# Patient Record
Sex: MALE | Race: WHITE
[De-identification: names, ages, dates, MRNs, and addresses within clinical notes are randomized per-mention and may not be internally consistent; named-entity substitution may affect disease eponyms.]

---

## 2017-02-13 ENCOUNTER — FORM ENCOUNTER (OUTPATIENT)
Age: 42
End: 2017-02-13

## 2017-02-13 ENCOUNTER — APPOINTMENT (OUTPATIENT)
Dept: FAMILY MEDICINE | Facility: CLINIC | Age: 42
End: 2017-02-13

## 2017-02-13 VITALS
HEIGHT: 70 IN | SYSTOLIC BLOOD PRESSURE: 124 MMHG | DIASTOLIC BLOOD PRESSURE: 70 MMHG | WEIGHT: 237.13 LBS | HEART RATE: 72 BPM | BODY MASS INDEX: 33.95 KG/M2

## 2017-02-14 ENCOUNTER — OUTPATIENT (OUTPATIENT)
Dept: OUTPATIENT SERVICES | Facility: HOSPITAL | Age: 42
LOS: 1 days | End: 2017-02-14
Payer: COMMERCIAL

## 2017-02-14 ENCOUNTER — APPOINTMENT (OUTPATIENT)
Dept: MRI IMAGING | Facility: CLINIC | Age: 42
End: 2017-02-14

## 2017-02-14 DIAGNOSIS — Z00.8 ENCOUNTER FOR OTHER GENERAL EXAMINATION: ICD-10-CM

## 2017-02-14 PROCEDURE — 72148 MRI LUMBAR SPINE W/O DYE: CPT

## 2017-03-24 ENCOUNTER — APPOINTMENT (OUTPATIENT)
Dept: FAMILY MEDICINE | Facility: CLINIC | Age: 42
End: 2017-03-24
Payer: COMMERCIAL

## 2017-03-24 ENCOUNTER — APPOINTMENT (OUTPATIENT)
Dept: FAMILY MEDICINE | Facility: CLINIC | Age: 42
End: 2017-03-24

## 2017-03-24 VITALS
WEIGHT: 229 LBS | BODY MASS INDEX: 32.78 KG/M2 | SYSTOLIC BLOOD PRESSURE: 120 MMHG | DIASTOLIC BLOOD PRESSURE: 70 MMHG | HEIGHT: 70 IN | HEART RATE: 72 BPM

## 2017-03-24 DIAGNOSIS — Z83.3 FAMILY HISTORY OF DIABETES MELLITUS: ICD-10-CM

## 2017-03-24 DIAGNOSIS — M54.9 DORSALGIA, UNSPECIFIED: ICD-10-CM

## 2018-03-29 ENCOUNTER — APPOINTMENT (OUTPATIENT)
Dept: FAMILY MEDICINE | Facility: CLINIC | Age: 43
End: 2018-03-29
Payer: COMMERCIAL

## 2018-03-29 ENCOUNTER — NON-APPOINTMENT (OUTPATIENT)
Age: 43
End: 2018-03-29

## 2018-03-29 VITALS
SYSTOLIC BLOOD PRESSURE: 118 MMHG | DIASTOLIC BLOOD PRESSURE: 76 MMHG | OXYGEN SATURATION: 97 % | HEIGHT: 70 IN | WEIGHT: 237.38 LBS | HEART RATE: 70 BPM | BODY MASS INDEX: 33.98 KG/M2 | RESPIRATION RATE: 16 BRPM

## 2018-03-29 DIAGNOSIS — Z86.39 PERSONAL HISTORY OF OTHER ENDOCRINE, NUTRITIONAL AND METABOLIC DISEASE: ICD-10-CM

## 2018-03-29 DIAGNOSIS — R79.89 OTHER SPECIFIED ABNORMAL FINDINGS OF BLOOD CHEMISTRY: ICD-10-CM

## 2018-03-29 PROCEDURE — 99396 PREV VISIT EST AGE 40-64: CPT | Mod: 25

## 2018-03-29 PROCEDURE — G0447 BEHAVIOR COUNSEL OBESITY 15M: CPT

## 2018-03-29 PROCEDURE — 93000 ELECTROCARDIOGRAM COMPLETE: CPT

## 2018-03-29 RX ORDER — AZITHROMYCIN 250 MG/1
250 TABLET, FILM COATED ORAL
Qty: 6 | Refills: 0 | Status: COMPLETED | COMMUNITY
Start: 2016-12-21 | End: 2018-03-29

## 2018-03-29 RX ORDER — OXYCODONE AND ACETAMINOPHEN 5; 325 MG/1; MG/1
5-325 TABLET ORAL
Qty: 30 | Refills: 0 | Status: COMPLETED | COMMUNITY
Start: 2017-02-13 | End: 2018-03-29

## 2018-03-29 RX ORDER — CYCLOBENZAPRINE HYDROCHLORIDE 10 MG/1
10 TABLET, FILM COATED ORAL
Qty: 20 | Refills: 0 | Status: COMPLETED | COMMUNITY
Start: 2017-02-05 | End: 2018-03-29

## 2018-03-29 RX ORDER — IBUPROFEN 800 MG/1
800 TABLET, FILM COATED ORAL
Qty: 30 | Refills: 0 | Status: COMPLETED | COMMUNITY
Start: 2017-02-05 | End: 2018-03-29

## 2018-03-29 RX ORDER — METHYLPREDNISOLONE 4 MG/1
4 TABLET ORAL
Qty: 1 | Refills: 0 | Status: COMPLETED | COMMUNITY
Start: 2017-02-13 | End: 2018-03-29

## 2019-02-28 ENCOUNTER — APPOINTMENT (OUTPATIENT)
Dept: FAMILY MEDICINE | Facility: CLINIC | Age: 44
End: 2019-02-28

## 2019-03-05 ENCOUNTER — TRANSCRIPTION ENCOUNTER (OUTPATIENT)
Age: 44
End: 2019-03-05

## 2019-03-20 ENCOUNTER — APPOINTMENT (OUTPATIENT)
Dept: ORTHOPEDIC SURGERY | Facility: CLINIC | Age: 44
End: 2019-03-20
Payer: COMMERCIAL

## 2019-03-20 ENCOUNTER — TRANSCRIPTION ENCOUNTER (OUTPATIENT)
Age: 44
End: 2019-03-20

## 2019-03-20 VITALS
BODY MASS INDEX: 32.93 KG/M2 | HEART RATE: 86 BPM | DIASTOLIC BLOOD PRESSURE: 80 MMHG | WEIGHT: 230 LBS | HEIGHT: 70 IN | SYSTOLIC BLOOD PRESSURE: 118 MMHG

## 2019-03-20 DIAGNOSIS — M75.21 BICIPITAL TENDINITIS, RIGHT SHOULDER: ICD-10-CM

## 2019-03-20 PROCEDURE — 99203 OFFICE O/P NEW LOW 30 MIN: CPT

## 2019-03-20 PROCEDURE — 73080 X-RAY EXAM OF ELBOW: CPT | Mod: RT

## 2019-03-20 RX ORDER — TESTOSTERONE CYPIONATE 200 MG/ML
200 INJECTION, SOLUTION INTRAMUSCULAR
Refills: 0 | Status: DISCONTINUED | COMMUNITY
Start: 2018-03-29 | End: 2019-03-20

## 2019-03-20 RX ORDER — NAPROXEN 500 MG/1
500 TABLET ORAL
Qty: 60 | Refills: 2 | Status: ACTIVE | COMMUNITY
Start: 2019-03-20 | End: 1900-01-01

## 2019-03-22 PROBLEM — M75.21 BICEPS TENDINITIS OF RIGHT UPPER EXTREMITY: Status: ACTIVE | Noted: 2019-03-22

## 2019-03-22 NOTE — CONSULT LETTER
[Dear  ___] : Dear  [unfilled], [Consult Letter:] : I had the pleasure of evaluating your patient, [unfilled]. [Please see my note below.] : Please see my note below. [Consult Closing:] : Thank you very much for allowing me to participate in the care of this patient.  If you have any questions, please do not hesitate to contact me. [Sincerely,] : Sincerely, [FreeTextEntry2] : John Michael Reyes, DO [FreeTextEntry3] : Jason Ball DO.\par Sports Medicine \par \par Orthopaedic Surgery\par [facility]\par \par [address] \par [address]\[place] [address]\par \par [address] [facility] \[address] \[place] [address]\par \par Tel [phone]\par Fax [phone]\par \par

## 2019-03-22 NOTE — HISTORY OF PRESENT ILLNESS
[de-identified] : Jason is a 43 we'll now comes in complaining of forearm pain. States it started when he was attempting arm wrestling. He is a right-hand dominant male who currently works as a . He rested his arm after the event occurred and felt improvement. Her pain subsequently came back. He has difficulty doing certain activities. He is having difficulty weight lifting. He has taken some anti-inflammatories given him mild to moderate relief.

## 2019-03-22 NOTE — DISCUSSION/SUMMARY
[de-identified] : Jason is a 43-year-old male with right elbow pain secondary to biceps tendinitis. We had a thorough discussion regarding the nature of his pain as well as all treatment options at this time he elects for conservative management with a naproxen regimen and a home exercise program. I think that he may return to working out but use pain as an indicator. I'll see him back in 2-3 months for clinical reassessment. He agrees with the above plan and all questions were answered.

## 2019-03-22 NOTE — PHYSICAL EXAM
[de-identified] : Physical Exam:\par General: Well appearing, no acute distress, A&O\par Neurologic: A&Ox3, No focal deficits\par Head: NCAT without abrasions, lacerations, or ecchymosis to head, face, or scalp\par Eyes: No scleral icterus, no gross abnormalities\par Respiratory: Equal chest wall expansion bilaterally, no accessory muscle use\par Lymphatic: No lymphadenopathy palpated\par Skin: Warm and dry\par Psychiatric: Normal mood and affect\par Right Elbow Exam\par \par Skin: Clean, dry, intact. No ecchymosis. No swelling. No palpable joint effusion.\par ROM: RIGHT  0-130, full supination/pronation.  LEFT 0-130, full supination/pronation.\par Painful ROM: None\par Tenderness: [No] medial epicondyle pain. [No] lateral epicondyle pain. [No] olecranon pain. No pain at radial head.\par Strength: 5/5 elbow flexion, 5/5 elbow extension, 5/5 supination, 5/5 pronation\par Stability: Stable to vaus/valgus stress\par Vasc: 2+ radial pulse, <2s cap refill\par Sensation: In tact to light touch throughout\par Neuro: Negative tinels at ulnar canal, AIN/PIN/Ulnar nerve in tact to motor/sensation.\par Pain with deep palpation at this subdural tuberosity. Pain with resisted wrist extension. Pain with resisted wrist supination. Pain with elbow flexion.\par  [de-identified] : \par The following radiographs were ordered and read by me during this patients visit. I reviewed each radiograph in detail with the patient and discussed the findings as highlighted below. \par \par 3 views of the right elbow were obtained today that show no fracture, dislocation. There is no degenerative change seen. There is no malalignment. No obvious osseous abnormality. Otherwise unremarkable.

## 2019-04-12 ENCOUNTER — APPOINTMENT (OUTPATIENT)
Dept: FAMILY MEDICINE | Facility: CLINIC | Age: 44
End: 2019-04-12
Payer: COMMERCIAL

## 2019-04-12 ENCOUNTER — NON-APPOINTMENT (OUTPATIENT)
Age: 44
End: 2019-04-12

## 2019-04-12 VITALS
HEART RATE: 73 BPM | SYSTOLIC BLOOD PRESSURE: 128 MMHG | BODY MASS INDEX: 35.22 KG/M2 | RESPIRATION RATE: 16 BRPM | DIASTOLIC BLOOD PRESSURE: 70 MMHG | OXYGEN SATURATION: 98 % | HEIGHT: 70 IN | WEIGHT: 246 LBS

## 2019-04-12 DIAGNOSIS — B35.3 TINEA PEDIS: ICD-10-CM

## 2019-04-12 PROCEDURE — 99396 PREV VISIT EST AGE 40-64: CPT | Mod: 25

## 2019-04-12 PROCEDURE — 93000 ELECTROCARDIOGRAM COMPLETE: CPT

## 2019-04-12 NOTE — ASSESSMENT
[FreeTextEntry1] : obesity - Advised lifestyle modifications for wt loss. Healthy diet and regular exercise regimen discussed w/ pt\par b/l tinea pedis - start topical lamisil bid\par ED - refill viagra prn.\par Healthy diet and regular exercise regimen discussed w/ pt.\par Screening labs ordered\par flu vaccine in the fall recommended\par Any questions call office

## 2019-04-16 LAB
ALBUMIN SERPL ELPH-MCNC: 5.2 G/DL
ALP BLD-CCNC: 78 U/L
ALT SERPL-CCNC: 39 U/L
ANION GAP SERPL CALC-SCNC: 17 MMOL/L
APPEARANCE: CLEAR
AST SERPL-CCNC: 32 U/L
BACTERIA: NEGATIVE
BASOPHILS # BLD AUTO: 0.02 K/UL
BASOPHILS NFR BLD AUTO: 0.4 %
BILIRUB SERPL-MCNC: 0.5 MG/DL
BILIRUBIN URINE: NEGATIVE
BLOOD URINE: NEGATIVE
BUN SERPL-MCNC: 13 MG/DL
CALCIUM SERPL-MCNC: 9.8 MG/DL
CHLORIDE SERPL-SCNC: 105 MMOL/L
CHOLEST SERPL-MCNC: 217 MG/DL
CHOLEST/HDLC SERPL: 4.4 RATIO
CO2 SERPL-SCNC: 21 MMOL/L
COLOR: YELLOW
CREAT SERPL-MCNC: 0.99 MG/DL
EOSINOPHIL # BLD AUTO: 0.11 K/UL
EOSINOPHIL NFR BLD AUTO: 1.9 %
ESTIMATED AVERAGE GLUCOSE: 103 MG/DL
GLUCOSE QUALITATIVE U: NEGATIVE
GLUCOSE SERPL-MCNC: 94 MG/DL
HBA1C MFR BLD HPLC: 5.2 %
HCT VFR BLD CALC: 48.2 %
HDLC SERPL-MCNC: 49 MG/DL
HGB BLD-MCNC: 16.1 G/DL
HYALINE CASTS: 1 /LPF
IMM GRANULOCYTES NFR BLD AUTO: 0.4 %
KETONES URINE: NEGATIVE
LDLC SERPL CALC-MCNC: 131 MG/DL
LEUKOCYTE ESTERASE URINE: NEGATIVE
LYMPHOCYTES # BLD AUTO: 1.64 K/UL
LYMPHOCYTES NFR BLD AUTO: 29 %
MAN DIFF?: NORMAL
MCHC RBC-ENTMCNC: 30.1 PG
MCHC RBC-ENTMCNC: 33.4 GM/DL
MCV RBC AUTO: 90.1 FL
MICROSCOPIC-UA: NORMAL
MONOCYTES # BLD AUTO: 0.58 K/UL
MONOCYTES NFR BLD AUTO: 10.3 %
NEUTROPHILS # BLD AUTO: 3.28 K/UL
NEUTROPHILS NFR BLD AUTO: 58 %
NITRITE URINE: NEGATIVE
PH URINE: 6
PLATELET # BLD AUTO: 184 K/UL
POTASSIUM SERPL-SCNC: 4.3 MMOL/L
PROT SERPL-MCNC: 7.1 G/DL
PROTEIN URINE: NEGATIVE
PSA SERPL-MCNC: 1.6 NG/ML
RBC # BLD: 5.35 M/UL
RBC # FLD: 12.7 %
RED BLOOD CELLS URINE: 2 /HPF
SODIUM SERPL-SCNC: 143 MMOL/L
SPECIFIC GRAVITY URINE: 1.02
SQUAMOUS EPITHELIAL CELLS: 0 /HPF
T4 FREE SERPL-MCNC: 1.1 NG/DL
TRIGL SERPL-MCNC: 185 MG/DL
TSH SERPL-ACNC: 1.51 UIU/ML
UROBILINOGEN URINE: NORMAL
WBC # FLD AUTO: 5.65 K/UL
WHITE BLOOD CELLS URINE: 0 /HPF

## 2019-04-16 RX ORDER — TERBINAFINE HYDROCHLORIDE 0.84 G/125ML
1 LIQUID TOPICAL TWICE DAILY
Qty: 1 | Refills: 1 | Status: ACTIVE | COMMUNITY
Start: 2019-04-12 | End: 1900-01-01

## 2020-01-05 ENCOUNTER — TRANSCRIPTION ENCOUNTER (OUTPATIENT)
Age: 45
End: 2020-01-05

## 2020-01-19 ENCOUNTER — TRANSCRIPTION ENCOUNTER (OUTPATIENT)
Age: 45
End: 2020-01-19

## 2020-04-30 ENCOUNTER — NON-APPOINTMENT (OUTPATIENT)
Age: 45
End: 2020-04-30

## 2020-04-30 ENCOUNTER — APPOINTMENT (OUTPATIENT)
Dept: FAMILY MEDICINE | Facility: CLINIC | Age: 45
End: 2020-04-30
Payer: COMMERCIAL

## 2020-04-30 VITALS
HEIGHT: 70 IN | OXYGEN SATURATION: 98 % | SYSTOLIC BLOOD PRESSURE: 124 MMHG | DIASTOLIC BLOOD PRESSURE: 80 MMHG | HEART RATE: 74 BPM | BODY MASS INDEX: 31.64 KG/M2 | WEIGHT: 221 LBS

## 2020-04-30 DIAGNOSIS — Z23 ENCOUNTER FOR IMMUNIZATION: ICD-10-CM

## 2020-04-30 PROCEDURE — 90715 TDAP VACCINE 7 YRS/> IM: CPT

## 2020-04-30 PROCEDURE — 99396 PREV VISIT EST AGE 40-64: CPT | Mod: 25

## 2020-04-30 PROCEDURE — 93000 ELECTROCARDIOGRAM COMPLETE: CPT

## 2020-04-30 PROCEDURE — 90471 IMMUNIZATION ADMIN: CPT

## 2020-05-04 LAB
ALBUMIN SERPL ELPH-MCNC: 4.9 G/DL
ALP BLD-CCNC: 80 U/L
ALT SERPL-CCNC: 23 U/L
ANION GAP SERPL CALC-SCNC: 16 MMOL/L
APPEARANCE: CLEAR
AST SERPL-CCNC: 22 U/L
BACTERIA: NEGATIVE
BASOPHILS # BLD AUTO: 0.03 K/UL
BASOPHILS NFR BLD AUTO: 0.4 %
BILIRUB SERPL-MCNC: 0.5 MG/DL
BILIRUBIN URINE: NEGATIVE
BLOOD URINE: NEGATIVE
BUN SERPL-MCNC: 18 MG/DL
CALCIUM SERPL-MCNC: 9.8 MG/DL
CHLORIDE SERPL-SCNC: 99 MMOL/L
CHOLEST SERPL-MCNC: 218 MG/DL
CHOLEST/HDLC SERPL: 3.4 RATIO
CO2 SERPL-SCNC: 26 MMOL/L
COLOR: NORMAL
CREAT SERPL-MCNC: 1.01 MG/DL
EOSINOPHIL # BLD AUTO: 0.06 K/UL
EOSINOPHIL NFR BLD AUTO: 0.9 %
ESTIMATED AVERAGE GLUCOSE: 100 MG/DL
GLUCOSE QUALITATIVE U: NEGATIVE
GLUCOSE SERPL-MCNC: 94 MG/DL
HBA1C MFR BLD HPLC: 5.1 %
HCT VFR BLD CALC: 49.8 %
HDLC SERPL-MCNC: 65 MG/DL
HGB BLD-MCNC: 16.5 G/DL
HYALINE CASTS: 0 /LPF
IMM GRANULOCYTES NFR BLD AUTO: 0.1 %
KETONES URINE: NEGATIVE
LDLC SERPL CALC-MCNC: 123 MG/DL
LEUKOCYTE ESTERASE URINE: NEGATIVE
LYMPHOCYTES # BLD AUTO: 1.7 K/UL
LYMPHOCYTES NFR BLD AUTO: 24.4 %
MAN DIFF?: NORMAL
MCHC RBC-ENTMCNC: 29.2 PG
MCHC RBC-ENTMCNC: 33.1 GM/DL
MCV RBC AUTO: 88 FL
MICROSCOPIC-UA: NORMAL
MONOCYTES # BLD AUTO: 0.54 K/UL
MONOCYTES NFR BLD AUTO: 7.8 %
NEUTROPHILS # BLD AUTO: 4.62 K/UL
NEUTROPHILS NFR BLD AUTO: 66.4 %
NITRITE URINE: NEGATIVE
PH URINE: 5.5
PLATELET # BLD AUTO: 205 K/UL
POTASSIUM SERPL-SCNC: 4 MMOL/L
PROT SERPL-MCNC: 7.3 G/DL
PROTEIN URINE: NEGATIVE
RBC # BLD: 5.66 M/UL
RBC # FLD: 12.5 %
RED BLOOD CELLS URINE: 0 /HPF
SODIUM SERPL-SCNC: 142 MMOL/L
SPECIFIC GRAVITY URINE: 1.02
SQUAMOUS EPITHELIAL CELLS: 0 /HPF
T4 FREE SERPL-MCNC: 0.9 NG/DL
TRIGL SERPL-MCNC: 154 MG/DL
TSH SERPL-ACNC: 2 UIU/ML
UROBILINOGEN URINE: NORMAL
WBC # FLD AUTO: 6.96 K/UL
WHITE BLOOD CELLS URINE: 0 /HPF

## 2020-05-04 NOTE — HISTORY OF PRESENT ILLNESS
[de-identified] : Pt in office for CPE. Pt c/o exacerbation of low back pain w/ L sciatica 2 weeks. Pt seeing chiropractor which helped. Pt taking motrin prn. Pt had been supplementing testosterone but stopped last yr due to elevated hb. 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 Pt has long term gf.

## 2020-05-04 NOTE — ASSESSMENT
[FreeTextEntry1] : obesity - encouraged cont'd lifestyle modifications for wt loss. Healthy diet and regular exercise regimen discussed w/ pt. Pt reports his weight loss goal is to be at approx 205lbs\par Healthy diet and regular exercise regimen discussed w/ pt.\par Screening labs ordered\par flu vaccine in the fall recommended\par tdap administered, pt consent given before administration and after discussion of risks/benefits, pt tolerated well\par Any questions call office

## 2021-02-23 ENCOUNTER — TRANSCRIPTION ENCOUNTER (OUTPATIENT)
Age: 46
End: 2021-02-23

## 2021-05-20 ENCOUNTER — APPOINTMENT (OUTPATIENT)
Dept: FAMILY MEDICINE | Facility: CLINIC | Age: 46
End: 2021-05-20

## 2021-05-27 ENCOUNTER — APPOINTMENT (OUTPATIENT)
Dept: FAMILY MEDICINE | Facility: CLINIC | Age: 46
End: 2021-05-27
Payer: COMMERCIAL

## 2021-05-27 ENCOUNTER — NON-APPOINTMENT (OUTPATIENT)
Age: 46
End: 2021-05-27

## 2021-05-27 VITALS
HEART RATE: 73 BPM | RESPIRATION RATE: 16 BRPM | OXYGEN SATURATION: 98 % | TEMPERATURE: 97.4 F | WEIGHT: 208 LBS | DIASTOLIC BLOOD PRESSURE: 80 MMHG | SYSTOLIC BLOOD PRESSURE: 120 MMHG | BODY MASS INDEX: 29.78 KG/M2 | HEIGHT: 70 IN

## 2021-05-27 VITALS
SYSTOLIC BLOOD PRESSURE: 120 MMHG | TEMPERATURE: 97.4 F | WEIGHT: 208 LBS | HEART RATE: 73 BPM | OXYGEN SATURATION: 98 % | HEIGHT: 70 IN | DIASTOLIC BLOOD PRESSURE: 80 MMHG | BODY MASS INDEX: 29.78 KG/M2

## 2021-05-27 DIAGNOSIS — E66.3 OVERWEIGHT: ICD-10-CM

## 2021-05-27 DIAGNOSIS — N52.9 MALE ERECTILE DYSFUNCTION, UNSPECIFIED: ICD-10-CM

## 2021-05-27 PROCEDURE — 93000 ELECTROCARDIOGRAM COMPLETE: CPT

## 2021-05-27 PROCEDURE — 99396 PREV VISIT EST AGE 40-64: CPT | Mod: 25

## 2021-05-27 PROCEDURE — 96127 BRIEF EMOTIONAL/BEHAV ASSMT: CPT

## 2021-05-27 NOTE — HISTORY OF PRESENT ILLNESS
[de-identified] : Pt in office for CPE. Pt's low back pain and sciatica improved after losing weight. Pt has heel spurs saw podiatry had injection in R heel 1 month ago. Pt taking naproxen prn. Pt has ED, uses viagra prn. 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 Pt has long term gf.

## 2021-06-01 LAB
ALBUMIN SERPL ELPH-MCNC: 5 G/DL
ALP BLD-CCNC: 77 U/L
ALT SERPL-CCNC: 21 U/L
ANION GAP SERPL CALC-SCNC: 14 MMOL/L
APPEARANCE: CLEAR
AST SERPL-CCNC: 23 U/L
BACTERIA: NEGATIVE
BASOPHILS # BLD AUTO: 0.03 K/UL
BASOPHILS NFR BLD AUTO: 0.4 %
BILIRUB SERPL-MCNC: 0.6 MG/DL
BILIRUBIN URINE: NEGATIVE
BLOOD URINE: NEGATIVE
BUN SERPL-MCNC: 17 MG/DL
CALCIUM SERPL-MCNC: 9.8 MG/DL
CHLORIDE SERPL-SCNC: 103 MMOL/L
CHOLEST SERPL-MCNC: 219 MG/DL
CO2 SERPL-SCNC: 25 MMOL/L
COLOR: NORMAL
CREAT SERPL-MCNC: 1.09 MG/DL
EOSINOPHIL # BLD AUTO: 0.09 K/UL
EOSINOPHIL NFR BLD AUTO: 1.2 %
ESTIMATED AVERAGE GLUCOSE: 100 MG/DL
GLUCOSE QUALITATIVE U: NEGATIVE
GLUCOSE SERPL-MCNC: 88 MG/DL
HBA1C MFR BLD HPLC: 5.1 %
HCT VFR BLD CALC: 48.9 %
HDLC SERPL-MCNC: 65 MG/DL
HGB BLD-MCNC: 16.4 G/DL
HYALINE CASTS: 0 /LPF
IMM GRANULOCYTES NFR BLD AUTO: 0.3 %
KETONES URINE: NEGATIVE
LDLC SERPL CALC-MCNC: 138 MG/DL
LEUKOCYTE ESTERASE URINE: NEGATIVE
LYMPHOCYTES # BLD AUTO: 2.1 K/UL
LYMPHOCYTES NFR BLD AUTO: 28.4 %
MAN DIFF?: NORMAL
MCHC RBC-ENTMCNC: 30.1 PG
MCHC RBC-ENTMCNC: 33.5 GM/DL
MCV RBC AUTO: 89.7 FL
MICROSCOPIC-UA: NORMAL
MONOCYTES # BLD AUTO: 0.56 K/UL
MONOCYTES NFR BLD AUTO: 7.6 %
NEUTROPHILS # BLD AUTO: 4.59 K/UL
NEUTROPHILS NFR BLD AUTO: 62.1 %
NITRITE URINE: NEGATIVE
NONHDLC SERPL-MCNC: 154 MG/DL
PH URINE: 5.5
PLATELET # BLD AUTO: 196 K/UL
POTASSIUM SERPL-SCNC: 4.3 MMOL/L
PROT SERPL-MCNC: 6.9 G/DL
PROTEIN URINE: NEGATIVE
PSA SERPL-MCNC: 1.59 NG/ML
RBC # BLD: 5.45 M/UL
RBC # FLD: 12.7 %
RED BLOOD CELLS URINE: 1 /HPF
SODIUM SERPL-SCNC: 141 MMOL/L
SPECIFIC GRAVITY URINE: 1.02
SQUAMOUS EPITHELIAL CELLS: 0 /HPF
T4 FREE SERPL-MCNC: 1.1 NG/DL
TRIGL SERPL-MCNC: 79 MG/DL
TSH SERPL-ACNC: 2.39 UIU/ML
UROBILINOGEN URINE: NORMAL
WBC # FLD AUTO: 7.39 K/UL
WHITE BLOOD CELLS URINE: 0 /HPF

## 2021-06-08 ENCOUNTER — TRANSCRIPTION ENCOUNTER (OUTPATIENT)
Age: 46
End: 2021-06-08

## 2021-08-22 NOTE — REVIEW OF SYSTEMS
[Joint Pain] : joint pain [Joint Stiffness] : joint stiffness [Negative] : Heme/Lymph [FreeTextEntry9] : right arm Patient refused to use walker despite explanation

## 2021-08-31 ENCOUNTER — TRANSCRIPTION ENCOUNTER (OUTPATIENT)
Age: 46
End: 2021-08-31

## 2021-11-29 ENCOUNTER — TRANSCRIPTION ENCOUNTER (OUTPATIENT)
Age: 46
End: 2021-11-29

## 2022-01-07 ENCOUNTER — APPOINTMENT (OUTPATIENT)
Dept: ORTHOPEDIC SURGERY | Facility: CLINIC | Age: 47
End: 2022-01-07

## 2022-01-26 ENCOUNTER — TRANSCRIPTION ENCOUNTER (OUTPATIENT)
Age: 47
End: 2022-01-26

## 2022-04-12 ENCOUNTER — TRANSCRIPTION ENCOUNTER (OUTPATIENT)
Age: 47
End: 2022-04-12

## 2022-09-26 NOTE — HISTORY OF PRESENT ILLNESS
Physical Therapy Evaluation    Referred by: William Cota PA-C; Medical Diagnosis (from order):    Diagnosis Information      Diagnosis    V45.89, V15.51 (ICD-9-CM) - Z98.890, Z87.81 (ICD-10-CM) - S/P ORIF (open reduction internal fixation) fracture              Visit: 1    Visit Type: Initial Evaluation  Treatment Diagnosis: right: ankle symptoms with increased pain/symptoms, impaired range of motion, impaired strength, impaired gait, impaired tissue mobility, impaired muscle length/flexibility, impaired joint play/mobility, impaired mobility, impaired balance, increased swelling, impaired tissue/wound healing, impaired motor function/performance/coordination, increased risk for falls  Date of surgery: 7/16/2022  Procedure: ORIF Right Trimalleolar ankle Fracture.     Diagnosis Precautions: Per Greyson Gusman MD: \"continue weight-bearing as tolerate, may discontinue the boot\"  Chart reviewed at time of initial evaluation (relevant co-morbidities, allergies, tests and medications listed):   Generalized osteoarthrosis, involving multiple*                 Comment: Osteoarthritis gen'l    Other chronic pain                                              Comment: LEFT LOW BACK, RIGHT 2 MIDDLE TOES NUMB    RAD (reactive airway disease)                                 COPD (chronic obstructive pulmonary disease) (*               Imaging relevant to current diagnosis:  Findings taken directly from radiology report    EXAM: XR ANKLE 3+ VW RIGHT.     HISTORY: Other specified postprocedural states, Personal history of  (healed) traumatic fracture. Postop. Ankle surgery was 07/16/2022.     COMPARISON: 08/30/2022, 08/02/2022, 07/14/2022.     TECHNIQUE: 3 views right ankle.           FINDINGS/IMPRESSION:      Again noted screw and plate and screw fixation across a distal fibular  fracture, and screw fixation across the medial and posterior malleolar  fractures.     Unchanged alignment. Minimal interval callus formation. However,  the lucent  fracture defects in the medial malleolus are still evident. Overlying soft  tissue swelling. Demineralization, probably disuse.     Slight articular surface incongruity in the tibial plafond, likely  secondary to the fracture. Mild degenerative changes talonavicular joint.  Mild Achilles and small plantar heel spurs.     Patchy somewhat irregular serpiginous sclerosis involving the lateral talar  dome, suggestive of previous avascular necrosis, also seen on the CT of  07/15/2022.  Diagnostic Tests: X-ray: reviewed.      SUBJECTIVE                                                                                                                 Patient reports he was drinking too much, turned too quickly, and then rolled his right ankle and broke it in 3 places. ORIF was done the following day to repair. Has been doing ok since, reports home therapy came to see him but did not treat him because he was already moving around really well. He has been walking without boot but did fall a few days ago, hit his left big toe and fell onto his bottom.     Prior related problems:   Bilateral total hip arthoplasty and right total knee arthoplasty     Occupation and Activities:  Job title/type of work: on disability   Pain / Symptoms:  Pain rating (out of 10): Current: 1 ; Best: 0; Worst: 1  Location: Nothing specific  Quality / Description: sore.  Alleviating Factors: rest, over-the-counter medication.   Progression since onset: improved  Function:   Limitations / Exacerbation Factors: pain, difficulty, increased time, lower body dressing, walking and standing, stairs, community distances, Patient reports he can walk about a block and a half. Uses electric scooter at grocery store. Not currently driving.   Wearing normal shoe in the house and boot in community.      Prior Level of Function: pain free ADLs and IADLs,  Patient Goals: \"walk better\"    Prior treatment: inpatient PT  Discharged from hospital, home health,  [de-identified] : Pt in office for CPE. Pt injured R forearm and bicep. Pt has been taking naproxen and resting arm from significant activity. Pt had been supplementing testosterone but stopped 9 months ago due to elevated hb. Pt has numbness in b/l 4th and 5th digits of hands. Denies CP, palpitations, dyspnea, n/v.\par Denies LUTS. 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 Pt has long term gf. or skilled nursing facility in last 30 days: no    Home Environment:   Patient lives with alone  Type of home: apartment  Assistance available: as needed  Feels safe at home/work/school.  2 or more falls or an unexplained fall with injury in the last year: Yes. patient currently being seen in PT    OBJECTIVE                                                                                                                     Incision/Wound:   - Location: Lateral right ankle, open (dry, fair healing)      Range of Motion (ROM)   (degrees unless noted; active unless noted; norms in ( ); negative=lacking to 0, positive=beyond 0)    Ankle:    - Dorsiflexion (20):        • Left: WNL        • Right: -5     - Plantar Flexion (40-50):        • Left: WNL        • Right: 30    - Inversion (30-35):        • Left: WNL        • Right: 8    - Eversion (15-20):        • Left: WNL        • Right: 18  First MTP (Hallux):    - Extension (50-70):        • Right: 68    Strength  (out of 5 unless noted, standard test position unless noted, lbs tested with hand held dynamometer)   Ankle:    - Dorsiflexion:        • Left: 5        • Right: 4+    - Plantar Flexion:        • Left: 5        • Right: 4    - Inversion:        • Left: 5         • Right: pain, 4-    - Eversion:        • Left: 5         • Right: 4  First MTP (Hallux):    - Extension:        • Right: 4-    - Flexion:        • Right: 4-      Muscle Flexibility:   Comments / Details:  gastrocnemius MLT:   right 0 degrees         Gait:   Ambulation without Assist Device:     Description: decreased eduardo/pace, Right: decreased stance time and decreased knee flexion    Comments / Details: Donning CAM boot on right.     Stair Ambulation:   Ascend:     Pattern: reciprocal    Railing: bilateral    Number of steps: 4    Step Height: 6  Descend:     Description and Details: unsteady    Outcome Measures:   Lower Extremity Functional Scale: LEFS Calculated Total: 42 (0=extreme difficulty;  80=no difficulty) see flowsheet for additional documentation    TREATMENT                                                                                                                initial evaluation completed    Therapeutic Exercise:  Patient was instructed in purpose and technique for completing home exercise program. Patient demonstrated proper technique with exercises noted below. Written handout was provided.    Ankle inversion and eversion with towel x 10 repetitions on right   Toe curls with towel x 10 repetitions on right   Seated heel and toe raises x 10 repetitions on right     Skilled input: verbal instruction/cues  education/instruction on: Patient educated on evaluation findings, pathophysiology of current condition, prognosis, treatment options, plan of care, and home exercise program as noted above.  instruction/cues for: Patient educated in use of cold pack for pain relief and swelling management. Patient to monitor skin for irritation.    Writer verbally educated and received verbal consent for hand placement, positioning of patient, and techniques to be performed today from patient for clothing adjustments for techniques, therapist position for techniques and hand placement and palpation for techniques as described above and how they are pertinent to the patient's plan of care.    Home Exercise Program/Education Materials: Ankle inversion and eversion with towel  Toe curls with towel  Seated heel and toe raises     ASSESSMENT                                                                                                           57 year old male patient has reported functional limitations listed above impacted by signs and symptoms consistent with below   • Involved:       - right ankle   • Symptoms/impairments: increased pain/symptoms, impaired range of motion, impaired strength, impaired gait, impaired tissue mobility, impaired muscle length/flexibility, impaired joint play/mobility,  impaired mobility, impaired balance, increased swelling, impaired tissue/wound healing, impaired motor function/performance/coordination and increased risk for falls    Patient presents s/p right Trimalleolar ankle fracture resulting in ORIF performed on 7/16/2022. Patient demonstrates impaired gait mechanics and donning CAM boot on right, able to ambulate without AD with good stability. Patient demonstrates impaired right ankle AROM and strength in all directions, most pronounced at ankle inversion and eversion. Patient able to perform home exercise program with good form and without increased symptoms following.       Prognosis: patient will benefit from skilled therapy  Rehabilitative potential is: good     • Predicted patient presentation: Low (stable) - Patient comorbidities and complexities, as defined above, will have little effect on progress for prescribed plan of care.  Patient Education:   Who will be receiving education: patient  Are they ready to learn: yes  Preferred learning style: written, verbal and demonstration  Barriers to learning: no barriers apparent at this time  Results of above outlined education: Verbalizes understanding and Demonstrates understanding      PLAN                                                                                                                         The following skilled interventions to be implemented to achieve goals listed below:Dry Needling  Gait Training (11163)  Manual Therapy (29324)  Neuromuscular Re-Education (18775)  Therapeutic Activity (00050)  Therapeutic Exercise (65601)  Electrical Stimulation (unattended, 15445 or )  Electrical Stimulation (attended, 48389)  Heat/Cold (85225)  Frequency / Duration: 2 times per week tapering as patient progresses for an estimated total of 14 visits for 12 weeks    Patient involved in and agreed to plan of care and goals.  Patient given attendance policy at time of initial evaluation.  Suggestions for next  session as indicated:   Progress per plan of care. Begin gastrocnemius stretching and rocker board.      GOALS                                                                                                                           Decrease pain/symptoms to 0/10  Improve involved strength to 5/5  Improve involved ROM to WFL without pain  The above improvements in impairments to assist in obtaining goals listed below  Long Term Goals: to be met by end of plan of care  1. Lower Extremity Functional Scale: Patient will complete form to reflect an improved raw score to greater than or equal to 70/80 to indicate patient reported improvement in function/disability/impairment (minimal detectable change: 9 points). Status: Evaluation: 42  2. Patient will demonstrate ability to negotiate level and unlevel surfaces at variable velocities, including change of direction without increased pain or instability to return to age appropriate and community activities at prior level of function.  3. Patient will ascend and descend 1 flight of steps, using reciprocal pattern, without pain, and without difficulty for safe access to laundry room and improve functional mobility.   4. Patient will complete single leg stance for 10 seconds without loss of balance for safe lower body dressing.  5. Patient will bend/squat without reported pain to be able to  an object from the floor.     6. Patient will be independent with progressed and modified home exercise program.      Therapy procedure time and total treatment time can be found documented on the Time Entry flowsheet

## 2022-12-21 ENCOUNTER — NON-APPOINTMENT (OUTPATIENT)
Age: 47
End: 2022-12-21

## 2023-02-02 ENCOUNTER — APPOINTMENT (OUTPATIENT)
Dept: FAMILY MEDICINE | Facility: CLINIC | Age: 48
End: 2023-02-02

## 2023-04-28 ENCOUNTER — NON-APPOINTMENT (OUTPATIENT)
Age: 48
End: 2023-04-28

## 2023-04-28 ENCOUNTER — APPOINTMENT (OUTPATIENT)
Dept: FAMILY MEDICINE | Facility: CLINIC | Age: 48
End: 2023-04-28
Payer: COMMERCIAL

## 2023-04-28 VITALS
HEIGHT: 70 IN | DIASTOLIC BLOOD PRESSURE: 79 MMHG | SYSTOLIC BLOOD PRESSURE: 128 MMHG | TEMPERATURE: 96 F | OXYGEN SATURATION: 97 % | HEART RATE: 79 BPM | BODY MASS INDEX: 31.5 KG/M2 | WEIGHT: 220 LBS

## 2023-04-28 DIAGNOSIS — Z00.00 ENCOUNTER FOR GENERAL ADULT MEDICAL EXAMINATION W/OUT ABNORMAL FINDINGS: ICD-10-CM

## 2023-04-28 DIAGNOSIS — Z13.220 ENCOUNTER FOR SCREENING FOR LIPOID DISORDERS: ICD-10-CM

## 2023-04-28 DIAGNOSIS — Z13.0 ENCOUNTER FOR SCREENING FOR OTHER SUSPECTED ENDOCRINE DISORDER: ICD-10-CM

## 2023-04-28 DIAGNOSIS — Z13.29 ENCOUNTER FOR SCREENING FOR OTHER SUSPECTED ENDOCRINE DISORDER: ICD-10-CM

## 2023-04-28 DIAGNOSIS — Z13.0 ENCOUNTER FOR SCREENING FOR DISEASES OF THE BLOOD AND BLOOD-FORMING ORGANS AND CERTAIN DISORDERS INVOLVING THE IMMUNE MECHANISM: ICD-10-CM

## 2023-04-28 DIAGNOSIS — Z13.228 ENCOUNTER FOR SCREENING FOR OTHER SUSPECTED ENDOCRINE DISORDER: ICD-10-CM

## 2023-04-28 DIAGNOSIS — E66.9 OBESITY, UNSPECIFIED: ICD-10-CM

## 2023-04-28 PROCEDURE — 93000 ELECTROCARDIOGRAM COMPLETE: CPT

## 2023-04-28 PROCEDURE — 36415 COLL VENOUS BLD VENIPUNCTURE: CPT

## 2023-04-28 PROCEDURE — 99396 PREV VISIT EST AGE 40-64: CPT | Mod: 25

## 2023-04-28 NOTE — HISTORY OF PRESENT ILLNESS
[de-identified] : Pt in office for CPE. Pt has hx of low back pain and sciatica improved after losing weight a few yrs ago. Pt has ED, uses viagra prn. Denies CP, palpitations, dyspnea, n/v.\par Denies LUTS.\par Nonsmoker\par ETOH use social\par Drug use denies\par Exercises regularly running 3x a week\par Diet significantly improved, but has been eating more portions lately\par Retired [employer] , now doing school security part time. Originally from [place]\par , has 2 children. Pt has long term gf.

## 2023-04-28 NOTE — ASSESSMENT
[FreeTextEntry1] : obesity - Advised lifestyle modifications for wt loss. Healthy diet and regular exercise regimen discussed w/ pt\par ED- refill viagra prn\par Healthy diet and regular exercise regimen discussed w/ pt.\par Screening labs ordered\par flu vaccine in the fall recommended\par Any questions call office

## 2023-05-01 LAB
ALBUMIN SERPL ELPH-MCNC: 4.9 G/DL
ALP BLD-CCNC: 76 U/L
ALT SERPL-CCNC: 29 U/L
ANION GAP SERPL CALC-SCNC: 15 MMOL/L
APPEARANCE: CLEAR
AST SERPL-CCNC: 28 U/L
BACTERIA: NEGATIVE /HPF
BASOPHILS # BLD AUTO: 0.02 K/UL
BASOPHILS NFR BLD AUTO: 0.4 %
BILIRUB SERPL-MCNC: 0.5 MG/DL
BILIRUBIN URINE: NEGATIVE
BLOOD URINE: NEGATIVE
BUN SERPL-MCNC: 19 MG/DL
CALCIUM SERPL-MCNC: 9.6 MG/DL
CAST: 0 /LPF
CHLORIDE SERPL-SCNC: 101 MMOL/L
CHOLEST SERPL-MCNC: 214 MG/DL
CO2 SERPL-SCNC: 24 MMOL/L
COLOR: YELLOW
CREAT SERPL-MCNC: 1.02 MG/DL
EGFR: 91 ML/MIN/1.73M2
EOSINOPHIL # BLD AUTO: 0.07 K/UL
EOSINOPHIL NFR BLD AUTO: 1.4 %
EPITHELIAL CELLS: 0 /HPF
ESTIMATED AVERAGE GLUCOSE: 108 MG/DL
GLUCOSE QUALITATIVE U: NEGATIVE MG/DL
GLUCOSE SERPL-MCNC: 104 MG/DL
HBA1C MFR BLD HPLC: 5.4 %
HCT VFR BLD CALC: 48.7 %
HDLC SERPL-MCNC: 61 MG/DL
HGB BLD-MCNC: 16.2 G/DL
IMM GRANULOCYTES NFR BLD AUTO: 0.2 %
KETONES URINE: NEGATIVE MG/DL
LDLC SERPL CALC-MCNC: 133 MG/DL
LEUKOCYTE ESTERASE URINE: NEGATIVE
LYMPHOCYTES # BLD AUTO: 1.39 K/UL
LYMPHOCYTES NFR BLD AUTO: 27.1 %
MAN DIFF?: NORMAL
MCHC RBC-ENTMCNC: 29.9 PG
MCHC RBC-ENTMCNC: 33.3 GM/DL
MCV RBC AUTO: 90 FL
MICROSCOPIC-UA: NORMAL
MONOCYTES # BLD AUTO: 0.46 K/UL
MONOCYTES NFR BLD AUTO: 9 %
NEUTROPHILS # BLD AUTO: 3.17 K/UL
NEUTROPHILS NFR BLD AUTO: 61.9 %
NITRITE URINE: NEGATIVE
NONHDLC SERPL-MCNC: 154 MG/DL
PH URINE: 5.5
PLATELET # BLD AUTO: 195 K/UL
POTASSIUM SERPL-SCNC: 4.4 MMOL/L
PROT SERPL-MCNC: 6.9 G/DL
PROTEIN URINE: NEGATIVE MG/DL
PSA SERPL-MCNC: 1.25 NG/ML
RBC # BLD: 5.41 M/UL
RBC # FLD: 12.6 %
RED BLOOD CELLS URINE: 0 /HPF
SODIUM SERPL-SCNC: 139 MMOL/L
SPECIFIC GRAVITY URINE: 1.02
T4 FREE SERPL-MCNC: 1.1 NG/DL
TRIGL SERPL-MCNC: 103 MG/DL
TSH SERPL-ACNC: 1.4 UIU/ML
UROBILINOGEN URINE: 0.2 MG/DL
WBC # FLD AUTO: 5.12 K/UL
WHITE BLOOD CELLS URINE: 0 /HPF

## 2023-11-08 ENCOUNTER — RX RENEWAL (OUTPATIENT)
Age: 48
End: 2023-11-08

## 2023-11-09 RX ORDER — SILDENAFIL 100 MG/1
100 TABLET, FILM COATED ORAL
Qty: 6 | Refills: 3 | Status: ACTIVE | COMMUNITY
Start: 2017-03-24 | End: 1900-01-01

## 2024-01-04 ENCOUNTER — NON-APPOINTMENT (OUTPATIENT)
Age: 49
End: 2024-01-04

## 2024-06-26 ENCOUNTER — NON-APPOINTMENT (OUTPATIENT)
Age: 49
End: 2024-06-26

## 2024-07-17 ENCOUNTER — APPOINTMENT (OUTPATIENT)
Dept: ORTHOPEDIC SURGERY | Facility: CLINIC | Age: 49
End: 2024-07-17

## 2024-07-17 ENCOUNTER — APPOINTMENT (OUTPATIENT)
Dept: ORTHOPEDIC SURGERY | Facility: CLINIC | Age: 49
End: 2024-07-17
Payer: COMMERCIAL

## 2024-07-17 VITALS
DIASTOLIC BLOOD PRESSURE: 105 MMHG | WEIGHT: 225 LBS | TEMPERATURE: 98.1 F | HEIGHT: 70 IN | BODY MASS INDEX: 32.21 KG/M2 | HEART RATE: 71 BPM | SYSTOLIC BLOOD PRESSURE: 172 MMHG

## 2024-07-17 DIAGNOSIS — M79.643 PAIN IN UNSPECIFIED HAND: ICD-10-CM

## 2024-07-17 DIAGNOSIS — M79.646 PAIN IN UNSPECIFIED HAND: ICD-10-CM

## 2024-07-17 DIAGNOSIS — M65.9 SYNOVITIS AND TENOSYNOVITIS, UNSPECIFIED: ICD-10-CM

## 2024-07-17 DIAGNOSIS — M79.644 PAIN IN RIGHT FINGER(S): ICD-10-CM

## 2024-07-17 PROCEDURE — 20600 DRAIN/INJ JOINT/BURSA W/O US: CPT | Mod: F6

## 2024-07-17 PROCEDURE — 99204 OFFICE O/P NEW MOD 45 MIN: CPT | Mod: 25

## 2024-07-17 PROCEDURE — 73130 X-RAY EXAM OF HAND: CPT | Mod: 50

## 2024-07-17 RX ORDER — MELOXICAM 15 MG/1
15 TABLET ORAL DAILY
Qty: 15 | Refills: 1 | Status: ACTIVE | COMMUNITY
Start: 2024-07-17 | End: 1900-01-01

## 2024-08-20 ENCOUNTER — APPOINTMENT (OUTPATIENT)
Dept: ORTHOPEDIC SURGERY | Facility: CLINIC | Age: 49
End: 2024-08-20

## 2024-08-20 DIAGNOSIS — M79.646 PAIN IN UNSPECIFIED HAND: ICD-10-CM

## 2024-08-20 DIAGNOSIS — M65.9 SYNOVITIS AND TENOSYNOVITIS, UNSPECIFIED: ICD-10-CM

## 2024-08-20 DIAGNOSIS — M79.643 PAIN IN UNSPECIFIED HAND: ICD-10-CM

## 2024-08-20 PROCEDURE — 20600 DRAIN/INJ JOINT/BURSA W/O US: CPT | Mod: F6

## 2024-08-20 PROCEDURE — 99214 OFFICE O/P EST MOD 30 MIN: CPT | Mod: 25

## 2024-08-20 RX ORDER — METHYLPREDNISOLONE 4 MG/1
4 TABLET ORAL
Qty: 1 | Refills: 0 | Status: ACTIVE | COMMUNITY
Start: 2024-08-20 | End: 1900-01-01

## 2024-08-20 NOTE — PHYSICAL EXAM
[de-identified] : Examination of the right index finger reveals generalized stiffness stiffness however he has intact active motion of both the DIP and PIP for both active flexion and extension.  He does have significant tenderness at the PIP joint with bogginess.  No signs of infection [de-identified] : [4] views of [bilateral hands and wrists] were reviewed today in my office and were seen by me and discussed with the patient.  These [show findings consistent with bilateral basal joint OA and findings of IP joint OA]

## 2024-08-20 NOTE — ASSESSMENT
[FreeTextEntry1] : ASSESSMENT: The patient comes in today with worsening symptoms of right index finger PIP joint swelling and discomfort as well as significant stiffness of the index finger.  With this in mind we have discussed importance of occupational therapy to further enhance his outcome.  We have discussed anti-inflammatory oral medication.  He does elect for an injection.   The patient was adequately and thoroughly informed of my assessment of their current condition(s).  - This may diminish bodily function for the extremity. We discussed prognosis, tx modalities including operative and nonoperative options for the above diagnostic assessment. As always, 2nd opinion is always provided as an option.  When accessible, I was able to review other physicians note(s) including reviewing other tests, imaging results as well as personally view these results for my own interpretation.   Injection:   The risks and benefits of a steroid injection were discussed in detail. The risks include but are not limited to: pain, infection, swelling, flare response, bleeding, subcutaneous fat atrophy, skin depigmentation and/or elevation of blood sugar. The risk of incomplete resolution of symptoms, recurrence and additional intervention was reviewed and considered by the patient. The patient agreed to proceed and under a sterile prep, I injected 1 unit 6mg into 1 cc of a combination of Celestone and Lidocaine into the right index PIP joint. The patient tolerated the injection well.  The patient was adequately and thoroughly informed of my assessment of their current condition(s). A prescription for Medrol was given today. The patient was instructed to take this with food and discontinue other NSAIDs while taking this. The risks, benefits and black box warnings were discussed. The patient was instructed to discontinue the medication if it began hurting his stomach.  DISCUSSION: 1.  Medrol steroid pack as above.  OT prescription provided.  Injection as above.  Follow-up 6 weeks 2. [x] 3. [x]

## 2024-08-20 NOTE — HISTORY OF PRESENT ILLNESS
[FreeTextEntry1] : Jason is a pleasant 49-year-old male who presents today with a persistent recurring history of right index finger discomfort specifically at the PIP joint.  Both him and his wife state that he has been performing a significant amount of home related chores from garage work to gardening and others and he now describes significant swelling and stiffness.  This has been lingering for a long time now.  Denies fevers chills or infection

## 2024-10-09 ENCOUNTER — APPOINTMENT (OUTPATIENT)
Dept: ORTHOPEDIC SURGERY | Facility: CLINIC | Age: 49
End: 2024-10-09

## 2025-06-17 ENCOUNTER — NON-APPOINTMENT (OUTPATIENT)
Age: 50
End: 2025-06-17

## 2025-06-18 ENCOUNTER — NON-APPOINTMENT (OUTPATIENT)
Age: 50
End: 2025-06-18

## 2025-06-19 ENCOUNTER — APPOINTMENT (OUTPATIENT)
Dept: FAMILY MEDICINE | Facility: CLINIC | Age: 50
End: 2025-06-19
Payer: COMMERCIAL

## 2025-06-19 VITALS
OXYGEN SATURATION: 98 % | HEIGHT: 70 IN | WEIGHT: 240 LBS | DIASTOLIC BLOOD PRESSURE: 80 MMHG | SYSTOLIC BLOOD PRESSURE: 122 MMHG | HEART RATE: 88 BPM | TEMPERATURE: 97.2 F | BODY MASS INDEX: 34.36 KG/M2

## 2025-06-19 PROCEDURE — 93000 ELECTROCARDIOGRAM COMPLETE: CPT

## 2025-06-19 PROCEDURE — 99213 OFFICE O/P EST LOW 20 MIN: CPT | Mod: 25

## 2025-06-19 PROCEDURE — 99396 PREV VISIT EST AGE 40-64: CPT

## 2025-06-19 RX ORDER — CIPROFLOXACIN HYDROCHLORIDE 3 MG/ML
0.3 SOLUTION OPHTHALMIC
Qty: 1 | Refills: 0 | Status: ACTIVE | COMMUNITY
Start: 2025-06-19 | End: 1900-01-01

## 2025-06-24 LAB
ALBUMIN SERPL ELPH-MCNC: 4.8 G/DL
ALP BLD-CCNC: 80 U/L
ALT SERPL-CCNC: 43 U/L
ANION GAP SERPL CALC-SCNC: 17 MMOL/L
APPEARANCE: CLEAR
AST SERPL-CCNC: 37 U/L
BACTERIA: NEGATIVE /HPF
BASOPHILS # BLD AUTO: 0.03 K/UL
BASOPHILS NFR BLD AUTO: 0.5 %
BILIRUB SERPL-MCNC: 0.5 MG/DL
BILIRUBIN URINE: NEGATIVE
BLOOD URINE: NEGATIVE
BUN SERPL-MCNC: 16 MG/DL
CALCIUM SERPL-MCNC: 9.5 MG/DL
CAST: 0 /LPF
CHLORIDE SERPL-SCNC: 103 MMOL/L
CHOLEST SERPL-MCNC: 241 MG/DL
CO2 SERPL-SCNC: 23 MMOL/L
COLOR: YELLOW
CREAT SERPL-MCNC: 0.97 MG/DL
EGFRCR SERPLBLD CKD-EPI 2021: 95 ML/MIN/1.73M2
EOSINOPHIL # BLD AUTO: 0.13 K/UL
EOSINOPHIL NFR BLD AUTO: 2.3 %
EPITHELIAL CELLS: 0 /HPF
ESTIMATED AVERAGE GLUCOSE: 111 MG/DL
GLUCOSE QUALITATIVE U: NEGATIVE MG/DL
GLUCOSE SERPL-MCNC: 100 MG/DL
HBA1C MFR BLD HPLC: 5.5 %
HCT VFR BLD CALC: 48.3 %
HDLC SERPL-MCNC: 57 MG/DL
HGB BLD-MCNC: 16 G/DL
IMM GRANULOCYTES NFR BLD AUTO: 0.2 %
KETONES URINE: NEGATIVE MG/DL
LDLC SERPL-MCNC: 162 MG/DL
LEUKOCYTE ESTERASE URINE: NEGATIVE
LYMPHOCYTES # BLD AUTO: 1.67 K/UL
LYMPHOCYTES NFR BLD AUTO: 29.9 %
MAN DIFF?: NORMAL
MCHC RBC-ENTMCNC: 29.5 PG
MCHC RBC-ENTMCNC: 33.1 G/DL
MCV RBC AUTO: 89 FL
MICROSCOPIC-UA: NORMAL
MONOCYTES # BLD AUTO: 0.53 K/UL
MONOCYTES NFR BLD AUTO: 9.5 %
NEUTROPHILS # BLD AUTO: 3.21 K/UL
NEUTROPHILS NFR BLD AUTO: 57.6 %
NITRITE URINE: NEGATIVE
NONHDLC SERPL-MCNC: 184 MG/DL
PH URINE: 6
PLATELET # BLD AUTO: 185 K/UL
POTASSIUM SERPL-SCNC: 4.1 MMOL/L
PROT SERPL-MCNC: 7.2 G/DL
PROTEIN URINE: NEGATIVE MG/DL
PSA SERPL-MCNC: 1.13 NG/ML
RBC # BLD: 5.43 M/UL
RBC # FLD: 13.1 %
RED BLOOD CELLS URINE: 0 /HPF
SODIUM SERPL-SCNC: 142 MMOL/L
SPECIFIC GRAVITY URINE: 1.01
T4 FREE SERPL-MCNC: 1 NG/DL
TESTOST SERPL-MCNC: 286 NG/DL
TRIGL SERPL-MCNC: 122 MG/DL
TSH SERPL-ACNC: 1.42 UIU/ML
UROBILINOGEN URINE: 0.2 MG/DL
WBC # FLD AUTO: 5.58 K/UL
WHITE BLOOD CELLS URINE: 0 /HPF